# Patient Record
Sex: FEMALE | Race: WHITE | Employment: PART TIME | URBAN - METROPOLITAN AREA
[De-identification: names, ages, dates, MRNs, and addresses within clinical notes are randomized per-mention and may not be internally consistent; named-entity substitution may affect disease eponyms.]

---

## 2017-01-27 ENCOUNTER — TELEPHONE (OUTPATIENT)
Dept: OBGYN CLINIC | Facility: CLINIC | Age: 42
End: 2017-01-27

## 2017-01-27 DIAGNOSIS — Z80.3 FHX: BREAST CANCER: ICD-10-CM

## 2017-01-27 DIAGNOSIS — Z12.31 ENCOUNTER FOR SCREENING MAMMOGRAM FOR HIGH-RISK PATIENT: Primary | ICD-10-CM

## 2017-01-27 NOTE — TELEPHONE ENCOUNTER
Will place order. Please make copy and send to pt. Please remind pt that she is due for annual exam in several months.

## 2017-01-27 NOTE — TELEPHONE ENCOUNTER
PT requesting a mammogram order   Dr. Miguel Cerda ordered her a mammogram order but she needs a different type of mammogram

## 2017-01-27 NOTE — TELEPHONE ENCOUNTER
Last mammogram done 10/19/2015; normal results. Pt has 3D mammograms done due to family history. Routed to Dr. Crawford Daughters. Please advise if OK to order 3D Mammogram for patient to be done at [address] [place] [address] for annual exam in March.

## 2017-02-16 ENCOUNTER — TELEPHONE (OUTPATIENT)
Dept: OBGYN CLINIC | Facility: CLINIC | Age: 42
End: 2017-02-16

## 2017-02-16 ENCOUNTER — HOSPITAL (OUTPATIENT)
Dept: OTHER | Age: 42
End: 2017-02-16
Attending: OBSTETRICS & GYNECOLOGY

## 2017-04-01 ENCOUNTER — OFFICE VISIT (OUTPATIENT)
Dept: OBGYN CLINIC | Facility: CLINIC | Age: 42
End: 2017-04-01

## 2017-04-01 VITALS
WEIGHT: 138 LBS | HEIGHT: 62 IN | HEART RATE: 120 BPM | DIASTOLIC BLOOD PRESSURE: 68 MMHG | BODY MASS INDEX: 25.4 KG/M2 | SYSTOLIC BLOOD PRESSURE: 114 MMHG

## 2017-04-01 DIAGNOSIS — Z97.5 IUD (INTRAUTERINE DEVICE) IN PLACE: ICD-10-CM

## 2017-04-01 DIAGNOSIS — R10.2 PELVIC PAIN IN FEMALE: Primary | ICD-10-CM

## 2017-04-01 DIAGNOSIS — R35.0 URINARY FREQUENCY: ICD-10-CM

## 2017-04-01 DIAGNOSIS — R82.90 ABNORMAL URINE ODOR: ICD-10-CM

## 2017-04-01 PROCEDURE — 99213 OFFICE O/P EST LOW 20 MIN: CPT | Performed by: OBSTETRICS & GYNECOLOGY

## 2017-04-01 PROCEDURE — 87086 URINE CULTURE/COLONY COUNT: CPT | Performed by: OBSTETRICS & GYNECOLOGY

## 2017-04-01 PROCEDURE — 81002 URINALYSIS NONAUTO W/O SCOPE: CPT | Performed by: OBSTETRICS & GYNECOLOGY

## 2017-04-01 RX ORDER — GARLIC EXTRACT 500 MG
1 CAPSULE ORAL DAILY
COMMUNITY
End: 2021-01-19

## 2017-04-01 NOTE — PROGRESS NOTES
Patient is a 36year old here for evaluation of many month hx of LLQ and leg pain and recent onset of suprapubic pressure. Suffered L groin strain and L calf strain several months ago. Difficulty with ambulation and then started having LLQ  Discomfort.  Saw position and LLQ discomfort. Ibuprofen 600 mg BID.

## 2017-04-13 ENCOUNTER — TELEPHONE (OUTPATIENT)
Dept: OBGYN CLINIC | Facility: CLINIC | Age: 42
End: 2017-04-13

## 2017-04-13 ENCOUNTER — HOSPITAL ENCOUNTER (OUTPATIENT)
Dept: ULTRASOUND IMAGING | Facility: HOSPITAL | Age: 42
Discharge: HOME | End: 2017-04-13
Attending: OBSTETRICS & GYNECOLOGY
Payer: COMMERCIAL

## 2017-04-13 DIAGNOSIS — R10.2 PELVIC PAIN IN FEMALE: ICD-10-CM

## 2017-04-13 DIAGNOSIS — Z97.5 IUD (INTRAUTERINE DEVICE) IN PLACE: ICD-10-CM

## 2017-04-13 PROCEDURE — 76856 US EXAM PELVIC COMPLETE: CPT

## 2017-04-13 PROCEDURE — 76830 TRANSVAGINAL US NON-OB: CPT

## 2017-04-13 NOTE — TELEPHONE ENCOUNTER
Patient requesting ultrasound results. Results given to patient. Patient verbalized understanding. No further questions or concerns.

## 2017-04-19 NOTE — PROGRESS NOTES
Quick Note:    Normal findings on pelvic ultrasound. Voice mail to pt to call for questions/next step in evaluation.  Maybe PT.  ______

## 2017-06-16 ENCOUNTER — OFFICE VISIT (OUTPATIENT)
Dept: OBGYN CLINIC | Facility: CLINIC | Age: 42
End: 2017-06-16

## 2017-06-16 VITALS
SYSTOLIC BLOOD PRESSURE: 114 MMHG | WEIGHT: 135 LBS | HEART RATE: 94 BPM | DIASTOLIC BLOOD PRESSURE: 70 MMHG | HEIGHT: 61.75 IN | BODY MASS INDEX: 24.84 KG/M2

## 2017-06-16 DIAGNOSIS — Z12.4 ROUTINE PAPANICOLAOU SMEAR: ICD-10-CM

## 2017-06-16 DIAGNOSIS — Z00.00 LABORATORY EXAM ORDERED AS PART OF ROUTINE GENERAL MEDICAL EXAMINATION: ICD-10-CM

## 2017-06-16 DIAGNOSIS — Z01.419 WELL WOMAN EXAM WITH ROUTINE GYNECOLOGICAL EXAM: Primary | ICD-10-CM

## 2017-06-16 PROCEDURE — 88175 CYTOPATH C/V AUTO FLUID REDO: CPT | Performed by: OBSTETRICS & GYNECOLOGY

## 2017-06-16 PROCEDURE — 87624 HPV HI-RISK TYP POOLED RSLT: CPT | Performed by: OBSTETRICS & GYNECOLOGY

## 2017-06-16 PROCEDURE — 99396 PREV VISIT EST AGE 40-64: CPT | Performed by: OBSTETRICS & GYNECOLOGY

## 2017-06-16 NOTE — PROGRESS NOTES
Patient ID:   Tanner Bhatt  is a 36year old female         HPI:     Here for general gyn exam. Last seen 2017 for pelvic pain. Evaluation without diagnosis, including urine culture and ultrasound. New medical/surgical hx:  None. facility-administered medications on file prior to visit. PHYSICAL EXAM:    Physical Exam   /70 mmHg  Pulse 94  Ht 61.75\"  Wt 135 lb  BMI 24.91 kg/m2  LMP 05/25/2017 (Approximate)  Neck:  Supple. Thyroid:  Normal.  No cervical adenopathy.   Debby Area

## 2018-01-30 ENCOUNTER — PATIENT OUTREACH (OUTPATIENT)
Dept: FAMILY MEDICINE CLINIC | Facility: CLINIC | Age: 43
End: 2018-01-30

## 2018-02-17 ENCOUNTER — TELEPHONE (OUTPATIENT)
Dept: OBGYN CLINIC | Facility: CLINIC | Age: 43
End: 2018-02-17

## 2018-02-17 NOTE — TELEPHONE ENCOUNTER
Pt of Dr Tresa Ferreira  Pt states Dr Tresa Ferreira has given her antibiotics in the past for a sinus infection  Pt would like to get a prescription from Dr Tresa Ferreira for her current sinus infection   Pt states  does this for her all the time

## 2018-02-19 RX ORDER — AZITHROMYCIN 250 MG/1
TABLET, FILM COATED ORAL
Qty: 6 TABLET | Refills: 0 | Status: SHIPPED | OUTPATIENT
Start: 2018-02-19 | End: 2018-02-23

## 2018-02-19 NOTE — TELEPHONE ENCOUNTER
Left detailed message on patient's voicemail informing patient that medication was sent to pharmacy. Patient has FYI.

## 2018-02-22 ENCOUNTER — HOSPITAL (OUTPATIENT)
Dept: OTHER | Age: 43
End: 2018-02-22
Attending: OBSTETRICS & GYNECOLOGY

## 2018-02-23 ENCOUNTER — TELEPHONE (OUTPATIENT)
Dept: OBGYN CLINIC | Facility: CLINIC | Age: 43
End: 2018-02-23

## 2018-02-23 NOTE — TELEPHONE ENCOUNTER
[facility] Mammogram records 2/22/18    Medical Records in Dr. Bella Serrano office in [facility]

## 2018-02-28 ENCOUNTER — MED REC SCAN ONLY (OUTPATIENT)
Dept: OBGYN CLINIC | Facility: CLINIC | Age: 43
End: 2018-02-28

## 2018-03-14 ENCOUNTER — TELEPHONE (OUTPATIENT)
Dept: FAMILY MEDICINE CLINIC | Facility: CLINIC | Age: 43
End: 2018-03-14

## 2018-10-05 ENCOUNTER — OFFICE VISIT (OUTPATIENT)
Dept: OBGYN CLINIC | Facility: CLINIC | Age: 43
End: 2018-10-05
Payer: COMMERCIAL

## 2018-10-05 VITALS
SYSTOLIC BLOOD PRESSURE: 118 MMHG | HEIGHT: 62 IN | HEART RATE: 76 BPM | DIASTOLIC BLOOD PRESSURE: 70 MMHG | WEIGHT: 137 LBS | BODY MASS INDEX: 25.21 KG/M2

## 2018-10-05 DIAGNOSIS — Z80.3 FAMILY HISTORY OF BREAST CANCER IN MOTHER: ICD-10-CM

## 2018-10-05 DIAGNOSIS — Z01.419 WELL WOMAN EXAM WITH ROUTINE GYNECOLOGICAL EXAM: Primary | ICD-10-CM

## 2018-10-05 DIAGNOSIS — Z23 NEED FOR VACCINATION: ICD-10-CM

## 2018-10-05 DIAGNOSIS — Z12.4 CERVICAL CANCER SCREENING: ICD-10-CM

## 2018-10-05 DIAGNOSIS — Z12.39 BREAST CANCER SCREENING: ICD-10-CM

## 2018-10-05 PROCEDURE — 88175 CYTOPATH C/V AUTO FLUID REDO: CPT | Performed by: NURSE PRACTITIONER

## 2018-10-05 PROCEDURE — 90471 IMMUNIZATION ADMIN: CPT | Performed by: NURSE PRACTITIONER

## 2018-10-05 PROCEDURE — 99396 PREV VISIT EST AGE 40-64: CPT | Performed by: NURSE PRACTITIONER

## 2018-10-05 PROCEDURE — 90686 IIV4 VACC NO PRSV 0.5 ML IM: CPT | Performed by: NURSE PRACTITIONER

## 2018-10-05 NOTE — PROGRESS NOTES
Here for Routine Annual Exam  No concerns or questions. Menses are regular, cramps but manageable. Contraception- Paragard IUD. No C/O, does note increased pain in C/S since surgery. It feels deeper with menses.      ROS: No Cardiac, Respiratory, GI,

## 2018-11-02 ENCOUNTER — TELEPHONE (OUTPATIENT)
Dept: OBGYN CLINIC | Facility: CLINIC | Age: 43
End: 2018-11-02

## 2018-11-02 DIAGNOSIS — L76.82 INCISIONAL PAIN: Primary | ICD-10-CM

## 2018-11-02 NOTE — TELEPHONE ENCOUNTER
Please call patient and provide the number for [facility], pelvic floor therapy. (Routing comment)       Patient informed of [facility] number. [phone]. She verbalized understanding. No further questions or concerns.

## 2019-02-05 ENCOUNTER — TELEPHONE (OUTPATIENT)
Dept: OBGYN CLINIC | Facility: CLINIC | Age: 44
End: 2019-02-05

## 2019-02-05 DIAGNOSIS — Z80.3 FAMILY HISTORY OF BREAST CANCER: ICD-10-CM

## 2019-02-05 DIAGNOSIS — Z12.39 BREAST CANCER SCREENING: Primary | ICD-10-CM

## 2019-02-05 DIAGNOSIS — Z80.3 FAMILY HISTORY OF BREAST CANCER IN MOTHER: ICD-10-CM

## 2019-02-05 NOTE — TELEPHONE ENCOUNTER
Last OV: 10/5/18 with Guicho Lo for annual  Last mammogram: 2/22/18 screening mammogram, benign  Next appt: none scheduled; due 10/2019    screening mammogram ordered per protocol.     Order faxed to [facility] [place] Scheduling Dept via Epic at [phone]

## 2019-03-05 ENCOUNTER — TELEPHONE (OUTPATIENT)
Dept: OBGYN CLINIC | Facility: CLINIC | Age: 44
End: 2019-03-05

## 2019-03-05 ENCOUNTER — HOSPITAL (OUTPATIENT)
Dept: OTHER | Age: 44
End: 2019-03-05
Attending: NURSE PRACTITIONER

## 2019-03-05 NOTE — TELEPHONE ENCOUNTER
Received order request from [facility] for abnormal mammogram.    Signed by Migel Deutsch and faxed back to [address]

## 2019-03-06 ENCOUNTER — HOSPITAL (OUTPATIENT)
Dept: OTHER | Age: 44
End: 2019-03-06
Attending: NURSE PRACTITIONER

## 2019-12-10 NOTE — PROGRESS NOTES
GYN H&P     12/10/2019  1:26 PM    CC: Patient is here for annual exam  HPI: patient is a 38year old  here for her annual gyn exam.   She has no complaints. Menses are regular. Denies any pelvic pain or irregular vaginal discharge.    Contraception: Negative for activity change and appetite change. Gastrointestinal: Negative for abdominal pain, blood in stool and abdominal distention.    Genitourinary: Negative for bladder incontinence, urgency, vaginal bleeding, vaginal discharge, breast mass and br (intrauterine device) in place. 9/2010. Paragard     FHx: breast cancer       MMG due in march  Order placed. Will call if needs faxed elsewhere  Paragard due to be replaced in September. She is trying to get her  to have a vasectomy.  I recommend c

## 2019-12-11 ENCOUNTER — OFFICE VISIT (OUTPATIENT)
Dept: OBGYN CLINIC | Facility: CLINIC | Age: 44
End: 2019-12-11
Payer: COMMERCIAL

## 2019-12-11 VITALS
HEIGHT: 62 IN | WEIGHT: 141 LBS | SYSTOLIC BLOOD PRESSURE: 140 MMHG | DIASTOLIC BLOOD PRESSURE: 80 MMHG | BODY MASS INDEX: 25.95 KG/M2

## 2019-12-11 DIAGNOSIS — Z01.419 WELL WOMAN EXAM WITH ROUTINE GYNECOLOGICAL EXAM: Primary | ICD-10-CM

## 2019-12-11 DIAGNOSIS — Z80.3 FAMILY HISTORY OF BREAST CANCER: ICD-10-CM

## 2019-12-11 PROCEDURE — 88175 CYTOPATH C/V AUTO FLUID REDO: CPT | Performed by: OBSTETRICS & GYNECOLOGY

## 2019-12-11 PROCEDURE — 87624 HPV HI-RISK TYP POOLED RSLT: CPT | Performed by: OBSTETRICS & GYNECOLOGY

## 2019-12-11 PROCEDURE — 99396 PREV VISIT EST AGE 40-64: CPT | Performed by: OBSTETRICS & GYNECOLOGY

## 2019-12-16 NOTE — PROGRESS NOTES
Results reviewed with patient. Patient verbalized understanding. Pt states she spotted on 11/24, but bleeding started 11/27. Menses is regular q 28 days. Routed to Dr. Jan Cushing. Please advise.

## 2019-12-16 NOTE — PROGRESS NOTES
Results reviewed. Please inform patient pap is normal and HPV is negative, however, they saw endometrial cells on her pap. I suspect this may be secondary to her having the IUD and the strings perhaps holding onto the endometrial cells.  However, because of

## 2019-12-17 NOTE — PROGRESS NOTES
Patient informed of Dr. Marco Andrew recommendations. Verbalized understanding. No further questions or concerns. Call transferred to [facility] to schedule.

## 2020-01-15 ENCOUNTER — ULTRASOUND ENCOUNTER (OUTPATIENT)
Dept: OBGYN CLINIC | Facility: CLINIC | Age: 45
End: 2020-01-15
Payer: COMMERCIAL

## 2020-01-15 DIAGNOSIS — R87.618 BENIGN-APPEARING ENDOMETRIAL CELLS ON CERVICAL CYTOLOGY: Primary | ICD-10-CM

## 2020-01-15 PROCEDURE — 76830 TRANSVAGINAL US NON-OB: CPT | Performed by: OBSTETRICS & GYNECOLOGY

## 2020-01-15 PROCEDURE — 76856 US EXAM PELVIC COMPLETE: CPT | Performed by: OBSTETRICS & GYNECOLOGY

## 2020-03-07 DIAGNOSIS — Z12.39 SCREENING BREAST EXAMINATION: Primary | ICD-10-CM

## 2020-03-17 ENCOUNTER — APPOINTMENT (OUTPATIENT)
Dept: MAMMOGRAPHY | Age: 45
End: 2020-03-17

## 2020-03-27 ENCOUNTER — TELEPHONE (OUTPATIENT)
Dept: OBGYN CLINIC | Facility: CLINIC | Age: 45
End: 2020-03-27

## 2020-03-27 DIAGNOSIS — N63.10 LUMP OF RIGHT BREAST: Primary | ICD-10-CM

## 2020-03-27 NOTE — TELEPHONE ENCOUNTER
55 y/o called regarding lump under right breast. She states it is very small, similar to a pimple. She states she has lost 5-6 lbs and was unsure if it has been there or if it is new.  States she does have dense breasts and a lot of lumps but this one is ne

## 2020-03-27 NOTE — TELEPHONE ENCOUNTER
Per Dr. Pradeep Johns, ok to change to diagnostic mammogram with US if needed. Patient notified. Patient verbalized understanding. Order placed.

## 2020-03-27 NOTE — TELEPHONE ENCOUNTER
Pt calling and she postponed her Mammogram due to Virus    Pt now feels a lump under her right breast    Pt gets Mammo's done at [facility]    Please call

## 2020-04-07 NOTE — TELEPHONE ENCOUNTER
Patient wanted the order faxed to [facility]. This was faxed to [phone].

## 2020-04-13 NOTE — TELEPHONE ENCOUNTER
Re-Faxed to [facility] with Nurse's signature emergency care. For example, call if:  · You passed out (lost consciousness). Call your doctor now or seek immediate medical care if:  · You have symptoms of dehydration, such as:  ¨ Dry eyes and a dry mouth. ¨ Passing only a little dark urine. ¨ Feeling thirstier than usual.  · You have new or worsening belly pain. · You have a new or higher fever. · You vomit blood or what looks like coffee grounds. Watch closely for changes in your health, and be sure to contact your doctor if:  · You have ongoing nausea and vomiting. · Your vomiting is getting worse. · Your vomiting lasts longer than 2 days. · You are not getting better as expected. Where can you learn more? Go to https://HeatSync.Blitsy. org and sign in to your [employer] account. Enter 81 [id number] in the Rouxbe box to learn more about \"Nausea and Vomiting: Care Instructions. \"     If you do not have an account, please click on the \"Sign Up Now\" link. Current as of: March 20, 2017  Content Version: 11.3  © 7217-3813 [employer]. Care instructions adapted under license by [facility] ([facility]). If you have questions about a medical condition or this instruction, always ask your healthcare professional. [place] [address] any warranty or liability for your use of this information.

## 2020-04-21 ENCOUNTER — HOSPITAL ENCOUNTER (OUTPATIENT)
Dept: MAMMOGRAPHY | Age: 45
Discharge: HOME | End: 2020-04-21

## 2020-04-21 ENCOUNTER — HOSPITAL ENCOUNTER (OUTPATIENT)
Dept: ULTRASOUND IMAGING | Age: 45
Discharge: HOME | End: 2020-04-21
Attending: OBSTETRICS & GYNECOLOGY

## 2020-04-21 DIAGNOSIS — N63.0 LUMP IN FEMALE BREAST: ICD-10-CM

## 2020-04-21 DIAGNOSIS — N63.10 LUMP OF RIGHT BREAST: ICD-10-CM

## 2020-04-21 PROCEDURE — G0279 TOMOSYNTHESIS, MAMMO: HCPCS

## 2020-04-21 PROCEDURE — 76642 ULTRASOUND BREAST LIMITED: CPT

## 2020-10-01 ENCOUNTER — TELEPHONE (OUTPATIENT)
Dept: OBGYN CLINIC | Facility: CLINIC | Age: 45
End: 2020-10-01

## 2020-10-01 NOTE — TELEPHONE ENCOUNTER
Pt states that doctor was to call in a medication to help soften cervix. Please call pt when sent.     appt scheduled  Future Appointments   Date Time Provider Concepción Olsen   11/19/2020  3:30 PM Tracee Valverde MD [facility]   1/19/2021

## 2020-10-01 NOTE — TELEPHONE ENCOUNTER
54year old patient calling with questions about IUD. She currently has a paragard in place and it is due to come out. Wants to know if she should get another paragard or if there is one that can be used for a shorter amount of time.  She states that she d

## 2020-10-05 RX ORDER — MISOPROSTOL 200 UG/1
TABLET ORAL
Qty: 2 TABLET | Refills: 0 | Status: SHIPPED | OUTPATIENT
Start: 2020-10-05 | End: 2021-01-19

## 2020-10-05 NOTE — TELEPHONE ENCOUNTER
Please send cytotec 400 mcg per vagina 4 hours prior to procedure. Advise if she can take ibuprofen to take 600 mg ibuprofen at the same time. Please advise her may cause cramping, bleeding, N/V/diarrhea.  Thank you     Patient informed of recommendatio

## 2020-10-06 ENCOUNTER — TELEPHONE (OUTPATIENT)
Dept: OBGYN CLINIC | Facility: CLINIC | Age: 45
End: 2020-10-06

## 2020-10-06 NOTE — TELEPHONE ENCOUNTER
Patient is to have a 6-month follow-mammogram.  Please see if that is in the system. If not, please put the order in and then call patient.

## 2020-10-26 ENCOUNTER — TELEPHONE (OUTPATIENT)
Dept: OBGYN CLINIC | Facility: CLINIC | Age: 45
End: 2020-10-26

## 2020-10-26 DIAGNOSIS — N63.0 BREAST NODULE: Primary | ICD-10-CM

## 2020-10-26 NOTE — TELEPHONE ENCOUNTER
Last OV: 12/11/19 with Dr. An Gray for annual    Last mammogram: 4/21/20 bilateral diagnostic with u/s for c/o lump in right breast, birads 3- probable benign- recommendation for f/u mammo for left breast in 6 mos due to nodule found on imaging.  No f/u sammie

## 2020-11-09 ENCOUNTER — APPOINTMENT (OUTPATIENT)
Dept: MAMMOGRAPHY | Age: 45
End: 2020-11-09
Attending: OBSTETRICS & GYNECOLOGY

## 2020-11-18 ENCOUNTER — APPOINTMENT (OUTPATIENT)
Dept: MAMMOGRAPHY | Age: 45
End: 2020-11-18
Attending: OBSTETRICS & GYNECOLOGY

## 2020-11-30 ENCOUNTER — APPOINTMENT (OUTPATIENT)
Dept: MAMMOGRAPHY | Age: 45
End: 2020-11-30
Attending: OBSTETRICS & GYNECOLOGY

## 2020-12-08 ENCOUNTER — APPOINTMENT (OUTPATIENT)
Dept: MAMMOGRAPHY | Age: 45
End: 2020-12-08
Attending: OBSTETRICS & GYNECOLOGY

## 2020-12-17 ENCOUNTER — TELEPHONE (OUTPATIENT)
Dept: OBGYN CLINIC | Facility: CLINIC | Age: 45
End: 2020-12-17

## 2020-12-17 NOTE — TELEPHONE ENCOUNTER
Pt advised we do not have her blood type in Epic. Only checked during pregnancy and records are in storage. Pt would like her entire medical records. Routed to [facility] staff. Please call patient to instruct her how to obtain all her medical records.

## 2020-12-23 NOTE — TELEPHONE ENCOUNTER
Spoke with PT  Advised her Blood type is O+    Holding chart in [place] to send for Medical Records release in January

## 2021-01-13 ENCOUNTER — APPOINTMENT (OUTPATIENT)
Dept: MAMMOGRAPHY | Age: 46
End: 2021-01-13
Attending: OBSTETRICS & GYNECOLOGY

## 2021-01-18 PROBLEM — M54.16 LEFT LUMBAR RADICULITIS: Status: ACTIVE | Noted: 2019-10-29

## 2021-01-18 PROBLEM — Z30.433 ENCOUNTER FOR REMOVAL AND REINSERTION OF INTRAUTERINE CONTRACEPTIVE DEVICE (IUD): Status: ACTIVE | Noted: 2021-01-18

## 2021-01-18 PROBLEM — R94.31 QT PROLONGATION: Status: ACTIVE | Noted: 2019-09-11

## 2021-01-19 ENCOUNTER — OFFICE VISIT (OUTPATIENT)
Dept: OBGYN CLINIC | Facility: CLINIC | Age: 46
End: 2021-01-19
Payer: COMMERCIAL

## 2021-01-19 VITALS
HEIGHT: 62 IN | SYSTOLIC BLOOD PRESSURE: 120 MMHG | DIASTOLIC BLOOD PRESSURE: 78 MMHG | BODY MASS INDEX: 26.13 KG/M2 | WEIGHT: 142 LBS

## 2021-01-19 DIAGNOSIS — Z01.419 WELL WOMAN EXAM WITH ROUTINE GYNECOLOGICAL EXAM: Primary | ICD-10-CM

## 2021-01-19 DIAGNOSIS — Z80.3 FHX: BREAST CANCER: ICD-10-CM

## 2021-01-19 DIAGNOSIS — Z12.4 SCREENING FOR MALIGNANT NEOPLASM OF CERVIX: ICD-10-CM

## 2021-01-19 DIAGNOSIS — Z97.5 IUD (INTRAUTERINE DEVICE) IN PLACE: ICD-10-CM

## 2021-01-19 PROCEDURE — 87624 HPV HI-RISK TYP POOLED RSLT: CPT | Performed by: OBSTETRICS & GYNECOLOGY

## 2021-01-19 PROCEDURE — 88175 CYTOPATH C/V AUTO FLUID REDO: CPT | Performed by: OBSTETRICS & GYNECOLOGY

## 2021-01-19 PROCEDURE — 3074F SYST BP LT 130 MM HG: CPT | Performed by: OBSTETRICS & GYNECOLOGY

## 2021-01-19 PROCEDURE — 99396 PREV VISIT EST AGE 40-64: CPT | Performed by: OBSTETRICS & GYNECOLOGY

## 2021-01-19 PROCEDURE — 3008F BODY MASS INDEX DOCD: CPT | Performed by: OBSTETRICS & GYNECOLOGY

## 2021-01-19 PROCEDURE — 3078F DIAST BP <80 MM HG: CPT | Performed by: OBSTETRICS & GYNECOLOGY

## 2021-01-19 RX ORDER — MISOPROSTOL 200 UG/1
TABLET ORAL
Qty: 2 TABLET | Refills: 0 | Status: SHIPPED | OUTPATIENT
Start: 2021-01-19 | End: 2021-11-03

## 2021-01-19 NOTE — PROGRESS NOTES
GYN H&P     2021  11:11 AM    CC: Patient is here for annual exam    HPI: patient is a 54year old  here for her annual exam  She has paragard IUD in place. Due to be replaced.    Pap last year normal but with endometrial cells (but was near men Problem Relation Age of Onset   • Heart Disorder Father    • Lipids Father    • Hypertension Mother    • Cancer Mother    • Hypertension Maternal Grandmother    • Cancer Maternal Grandfather    • Heart Disorder Paternal Grandmother    • Heart Disorder [place] No erythema, tenderness or bleeding in the vagina. No foreign body in the vagina. No signs of injury in the vagina.       Genitourinary Comments: IUD strings very short but appear to be visible in the cervical canal  No menstrual blood noted, cervix

## 2021-01-19 NOTE — PATIENT INSTRUCTIONS
Cytotec 2 pills per vagina 4 hours prior. Place as far in as possible  Try to lay down after for about 30 minutes  Take 600 mg of ibuprofen at the same time. (3 OTC)  Eat something.     If breast tenderness continues, try Evening Primrose Oil (once daily) o

## 2021-01-20 ENCOUNTER — HOSPITAL ENCOUNTER (OUTPATIENT)
Dept: ULTRASOUND IMAGING | Age: 46
Discharge: HOME | End: 2021-01-20
Attending: OBSTETRICS & GYNECOLOGY

## 2021-01-20 ENCOUNTER — HOSPITAL ENCOUNTER (OUTPATIENT)
Dept: MAMMOGRAPHY | Age: 46
Discharge: HOME | End: 2021-01-20
Attending: OBSTETRICS & GYNECOLOGY

## 2021-01-20 DIAGNOSIS — R92.8 ABNORMAL MAMMOGRAM: ICD-10-CM

## 2021-01-20 DIAGNOSIS — N63.0 BREAST NODULE: ICD-10-CM

## 2021-01-20 LAB — HPV I/H RISK 1 DNA SPEC QL NAA+PROBE: NEGATIVE

## 2021-01-20 PROCEDURE — 76642 ULTRASOUND BREAST LIMITED: CPT

## 2021-01-20 PROCEDURE — G0279 TOMOSYNTHESIS, MAMMO: HCPCS

## 2021-01-21 ENCOUNTER — TELEPHONE (OUTPATIENT)
Dept: OBGYN CLINIC | Facility: CLINIC | Age: 46
End: 2021-01-21

## 2021-01-21 NOTE — TELEPHONE ENCOUNTER
Results of mammogram and orders for breast biopsy. This was put in Dr. Valentino Mutton mail box in [place].

## 2021-01-22 ENCOUNTER — MED REC SCAN ONLY (OUTPATIENT)
Dept: OBGYN CLINIC | Facility: CLINIC | Age: 46
End: 2021-01-22

## 2021-01-26 ENCOUNTER — HOSPITAL ENCOUNTER (OUTPATIENT)
Dept: ULTRASOUND IMAGING | Age: 46
Discharge: HOME | End: 2021-01-26
Attending: OBSTETRICS & GYNECOLOGY

## 2021-01-26 ENCOUNTER — HOSPITAL ENCOUNTER (OUTPATIENT)
Dept: MAMMOGRAPHY | Age: 46
Discharge: HOME | End: 2021-01-26
Attending: OBSTETRICS & GYNECOLOGY

## 2021-01-26 DIAGNOSIS — N63.20 LEFT BREAST MASS: ICD-10-CM

## 2021-01-26 DIAGNOSIS — N63.20 BREAST MASS, LEFT: ICD-10-CM

## 2021-01-26 PROCEDURE — 10006023 HB SUPPLY 272

## 2021-01-26 PROCEDURE — 77065 DX MAMMO INCL CAD UNI: CPT

## 2021-01-26 PROCEDURE — 88360 TUMOR IMMUNOHISTOCHEM/MANUAL: CPT | Performed by: OBSTETRICS & GYNECOLOGY

## 2021-01-26 PROCEDURE — 19083 BX BREAST 1ST LESION US IMAG: CPT

## 2021-01-26 PROCEDURE — 10006027 HB SUPPLY 278

## 2021-01-26 PROCEDURE — A4648 IMPLANTABLE TISSUE MARKER: HCPCS

## 2021-01-26 PROCEDURE — 88377 M/PHMTRC ALYS ISHQUANT/SEMIQ: CPT | Performed by: OBSTETRICS & GYNECOLOGY

## 2021-01-26 RX ORDER — LIDOCAINE HYDROCHLORIDE 20 MG/ML
4 INJECTION, SOLUTION INFILTRATION; PERINEURAL ONCE
Status: DISCONTINUED | OUTPATIENT
Start: 2021-01-26 | End: 2021-01-28 | Stop reason: HOSPADM

## 2021-01-28 LAB
ASR DISCLAIMER: NORMAL
CASE RPRT: NORMAL
CLINICAL INFO: NORMAL
PATH REPORT ADDENDUM.SYNOPTIC DOC: NORMAL
PATH REPORT.FINAL DX SPEC: NORMAL
PATH REPORT.GROSS SPEC: NORMAL

## 2021-01-29 ENCOUNTER — TELEPHONE (OUTPATIENT)
Dept: MAMMOGRAPHY | Age: 46
End: 2021-01-29

## 2021-01-29 ENCOUNTER — TELEPHONE (OUTPATIENT)
Dept: OBGYN CLINIC | Facility: CLINIC | Age: 46
End: 2021-01-29

## 2021-01-29 ENCOUNTER — CANCER NAVIGATOR (OUTPATIENT)
Dept: ONCOLOGY | Age: 46
End: 2021-01-29

## 2021-01-29 NOTE — TELEPHONE ENCOUNTER
Received Final Report of Mammo and US  From [address] [place] [address] in Dr. Topher hartmann in [place]

## 2021-01-29 NOTE — TELEPHONE ENCOUNTER
Received breast biopsy results from [address] [place] [address].    These results were scanned into the \"media\" tab for review. Bx results invasive ductal carcinoma, thus far prelim results ER/NC negative but HER-2neu pending.     I reviewed with

## 2021-02-01 ENCOUNTER — TELEPHONE (OUTPATIENT)
Dept: OBGYN CLINIC | Facility: CLINIC | Age: 46
End: 2021-02-01

## 2021-02-01 LAB
CASE OR PARAFFIN BLOCK NUMBER: NORMAL
CELLS COUNTED: NORMAL
COLD ISCHEMIA AND FIXATION TIMES: NORMAL
GENE ANALYSIS NARR RPT DOC: NORMAL
INTERPRETATIVE RANGES: NORMAL
LAB TEST METHOD: NORMAL
SERVICE CMNT-IMP: NORMAL
SPECIMEN LABELED AS: NORMAL

## 2021-02-01 NOTE — TELEPHONE ENCOUNTER
Patient cancelled  appt for her IUD removal and insert for 2/5. She would like to be rescheduled with in the next two weeks. Where should I put her in [place]? The 15th acute spot?  Please [place]

## 2021-02-02 NOTE — TELEPHONE ENCOUNTER
Hi Dr Robin Speaker  Patient can not go to [facility]. She lives up [facility]. Can I use an on call day? 2/10 acute 12:00 or another on call day with an opening? 2/25?

## 2021-02-03 ENCOUNTER — TELEPHONE (OUTPATIENT)
Dept: OBGYN CLINIC | Facility: CLINIC | Age: 46
End: 2021-02-03

## 2021-02-03 NOTE — TELEPHONE ENCOUNTER
Patient has Paragard IUD that is due for removal.    She was recently diagnosed with cancer so she is having challenges with scheduling all the appointments related to her cancer. She has canceled her IUD removal and insertion multiple times due to this.

## 2021-09-13 ENCOUNTER — TELEPHONE (OUTPATIENT)
Dept: OBGYN CLINIC | Facility: CLINIC | Age: 46
End: 2021-09-13

## 2021-09-13 NOTE — TELEPHONE ENCOUNTER
Last OV: 1/19/21 with Dr. Roxi Interiano for annual. Per notes, Paragard IUD in place and due to be removed this year. Returned call to patient. Questions answered regarding IUD.  Advised to schedule appt to have IUD removed and can discuss at appt if replacemen

## 2021-11-03 ENCOUNTER — OFFICE VISIT (OUTPATIENT)
Dept: OBGYN CLINIC | Facility: CLINIC | Age: 46
End: 2021-11-03
Payer: COMMERCIAL

## 2021-11-03 VITALS
HEIGHT: 62 IN | SYSTOLIC BLOOD PRESSURE: 120 MMHG | BODY MASS INDEX: 26.68 KG/M2 | WEIGHT: 145 LBS | DIASTOLIC BLOOD PRESSURE: 78 MMHG

## 2021-11-03 DIAGNOSIS — Z97.5 IUD (INTRAUTERINE DEVICE) IN PLACE: Primary | ICD-10-CM

## 2021-11-03 PROCEDURE — 58301 REMOVE INTRAUTERINE DEVICE: CPT | Performed by: OBSTETRICS & GYNECOLOGY

## 2021-11-03 PROCEDURE — 3074F SYST BP LT 130 MM HG: CPT | Performed by: OBSTETRICS & GYNECOLOGY

## 2021-11-03 PROCEDURE — 3078F DIAST BP <80 MM HG: CPT | Performed by: OBSTETRICS & GYNECOLOGY

## 2021-11-03 PROCEDURE — 3008F BODY MASS INDEX DOCD: CPT | Performed by: OBSTETRICS & GYNECOLOGY

## 2021-11-03 NOTE — PROGRESS NOTES
Procedure Note: IUD removal     Preoperative Diagnosis:  IUD in place     Postoperative Diagnosis:  S/p IUD removal     Indications:  51year old y/o  female with paragard IUD in place x 10 years.  She was dx with triple negative breast cancer last y
[phone]

## 2021-11-05 ENCOUNTER — MED REC SCAN ONLY (OUTPATIENT)
Dept: OBGYN CLINIC | Facility: CLINIC | Age: 46
End: 2021-11-05

## 2022-01-10 PROCEDURE — 87624 HPV HI-RISK TYP POOLED RSLT: CPT | Performed by: OBSTETRICS & GYNECOLOGY

## 2022-01-11 ENCOUNTER — OFFICE VISIT (OUTPATIENT)
Dept: OBGYN CLINIC | Facility: CLINIC | Age: 47
End: 2022-01-11
Payer: COMMERCIAL

## 2022-01-11 VITALS
DIASTOLIC BLOOD PRESSURE: 86 MMHG | BODY MASS INDEX: 27.27 KG/M2 | WEIGHT: 148.19 LBS | HEIGHT: 62 IN | SYSTOLIC BLOOD PRESSURE: 122 MMHG | HEART RATE: 90 BPM

## 2022-01-11 DIAGNOSIS — C50.919 MALIGNANT NEOPLASM OF BREAST IN FEMALE, ESTROGEN RECEPTOR NEGATIVE, UNSPECIFIED LATERALITY, UNSPECIFIED SITE OF BREAST (HCC): ICD-10-CM

## 2022-01-11 DIAGNOSIS — Z97.5 IUD (INTRAUTERINE DEVICE) IN PLACE: ICD-10-CM

## 2022-01-11 DIAGNOSIS — Z12.4 SCREENING FOR MALIGNANT NEOPLASM OF CERVIX: ICD-10-CM

## 2022-01-11 DIAGNOSIS — Z30.40 ENCOUNTER FOR SURVEILLANCE OF CONTRACEPTIVES, UNSPECIFIED CONTRACEPTIVE: ICD-10-CM

## 2022-01-11 DIAGNOSIS — Z01.419 WELL WOMAN EXAM WITH ROUTINE GYNECOLOGICAL EXAM: Primary | ICD-10-CM

## 2022-01-11 DIAGNOSIS — Z17.1 MALIGNANT NEOPLASM OF BREAST IN FEMALE, ESTROGEN RECEPTOR NEGATIVE, UNSPECIFIED LATERALITY, UNSPECIFIED SITE OF BREAST (HCC): ICD-10-CM

## 2022-01-11 PROCEDURE — 3074F SYST BP LT 130 MM HG: CPT | Performed by: OBSTETRICS & GYNECOLOGY

## 2022-01-11 PROCEDURE — 3008F BODY MASS INDEX DOCD: CPT | Performed by: OBSTETRICS & GYNECOLOGY

## 2022-01-11 PROCEDURE — 99396 PREV VISIT EST AGE 40-64: CPT | Performed by: OBSTETRICS & GYNECOLOGY

## 2022-01-11 PROCEDURE — 3079F DIAST BP 80-89 MM HG: CPT | Performed by: OBSTETRICS & GYNECOLOGY

## 2022-01-11 PROCEDURE — 88175 CYTOPATH C/V AUTO FLUID REDO: CPT | Performed by: OBSTETRICS & GYNECOLOGY

## 2022-01-11 RX ORDER — MISOPROSTOL 200 UG/1
400 TABLET ORAL ONCE
Qty: 2 TABLET | Refills: 0 | Status: SHIPPED | OUTPATIENT
Start: 2022-01-11 | End: 2022-01-11

## 2022-01-17 PROBLEM — C50.919 MALIGNANT NEOPLASM OF BREAST IN FEMALE, ESTROGEN RECEPTOR NEGATIVE (HCC): Status: ACTIVE | Noted: 2022-01-17

## 2022-01-17 PROBLEM — C50.919 MALIGNANT NEOPLASM OF BREAST IN FEMALE, ESTROGEN RECEPTOR NEGATIVE: Status: ACTIVE | Noted: 2022-01-17

## 2022-01-17 PROBLEM — Z30.433 ENCOUNTER FOR REMOVAL AND REINSERTION OF INTRAUTERINE CONTRACEPTIVE DEVICE (IUD): Status: RESOLVED | Noted: 2021-01-18 | Resolved: 2022-01-17

## 2022-01-17 PROBLEM — Z17.1 MALIGNANT NEOPLASM OF BREAST IN FEMALE, ESTROGEN RECEPTOR NEGATIVE (HCC): Status: ACTIVE | Noted: 2022-01-17

## 2022-01-17 PROBLEM — Z17.1 MALIGNANT NEOPLASM OF BREAST IN FEMALE, ESTROGEN RECEPTOR NEGATIVE: Status: ACTIVE | Noted: 2022-01-17

## 2022-01-18 LAB — HPV I/H RISK 1 DNA SPEC QL NAA+PROBE: NEGATIVE

## 2022-01-18 NOTE — PROGRESS NOTES
GYN H&P     2022  9:04 PM    CC: Patient is here for annual exam    HPI: patient is a 51year old  here for her annual exam    She was last seen 2021 for her paragard IUD removal. Since she has had two normal periods.  She does not want Cancer Mother    • Hypertension Maternal Grandmother    • Cancer Maternal Grandfather    • Heart Disorder Paternal Grandmother    • Heart Disorder Paternal Grandfather        Review of Systems   Constitutional: Negative for activity change and appetite roxanna here for   1. Well woman exam with routine gynecological exam    2. IUD (intrauterine device) in place. 9/2010. Paragard    3. Screening for malignant neoplasm of cervix    4.  Malignant neoplasm of breast in female, estrogen receptor negative, unspecified

## 2022-01-31 ENCOUNTER — TELEPHONE (OUTPATIENT)
Dept: OBGYN CLINIC | Facility: CLINIC | Age: 47
End: 2022-01-31

## 2022-01-31 NOTE — TELEPHONE ENCOUNTER
Pt is supposed to take some medication before her IUD tomorrow    Pt says it was not sent to [facility]

## 2022-01-31 NOTE — TELEPHONE ENCOUNTER
Patient notified that her prescription was sent 01/11/2022 and encouraged to call [facility] to have them fill the medication  Patient verbalizes understanding.

## 2022-02-01 ENCOUNTER — OFFICE VISIT (OUTPATIENT)
Dept: OBGYN CLINIC | Facility: CLINIC | Age: 47
End: 2022-02-01
Payer: COMMERCIAL

## 2022-02-01 VITALS
DIASTOLIC BLOOD PRESSURE: 88 MMHG | HEART RATE: 71 BPM | SYSTOLIC BLOOD PRESSURE: 130 MMHG | WEIGHT: 135 LBS | HEIGHT: 62 IN | BODY MASS INDEX: 24.84 KG/M2

## 2022-02-01 DIAGNOSIS — Z01.812 PRE-PROCEDURAL LABORATORY EXAMINATION: Primary | ICD-10-CM

## 2022-02-01 DIAGNOSIS — Z30.430 ENCOUNTER FOR IUD INSERTION: ICD-10-CM

## 2022-02-01 LAB — CONTROL LINE PRESENT WITH A CLEAR BACKGROUND (YES/NO): YES YES/NO

## 2022-02-01 PROCEDURE — 3008F BODY MASS INDEX DOCD: CPT | Performed by: OBSTETRICS & GYNECOLOGY

## 2022-02-01 PROCEDURE — 3079F DIAST BP 80-89 MM HG: CPT | Performed by: OBSTETRICS & GYNECOLOGY

## 2022-02-01 PROCEDURE — 81025 URINE PREGNANCY TEST: CPT | Performed by: OBSTETRICS & GYNECOLOGY

## 2022-02-01 PROCEDURE — 3075F SYST BP GE 130 - 139MM HG: CPT | Performed by: OBSTETRICS & GYNECOLOGY

## 2022-02-01 PROCEDURE — 58300 INSERT INTRAUTERINE DEVICE: CPT | Performed by: OBSTETRICS & GYNECOLOGY

## 2022-02-01 RX ORDER — COPPER 313.4 MG/1
1 INTRAUTERINE DEVICE INTRAUTERINE ONCE
Status: COMPLETED | OUTPATIENT
Start: 2022-02-01 | End: 2022-02-01

## 2022-02-01 RX ORDER — MISOPROSTOL 200 UG/1
TABLET ORAL
COMMUNITY
Start: 2022-01-31 | End: 2022-03-03

## 2022-02-01 RX ADMIN — COPPER 1 DEVICE: 313.4 INTRAUTERINE DEVICE INTRAUTERINE at 13:07:00

## 2022-02-01 NOTE — PROGRESS NOTES
Procedure:  1. IUD insertion, paragard      Findings: midposition uterus sounded to 8 cm with stenotic internal os. Able to be traversed with sound only    Indications:   Patient is a 51year old  who presents for paragard IUD insertion. She had used cytotec pre-procedure due to stenotic cervix Risks were discussed, including bleeding, infection, and uterine perforation. A urine HCG was performed and was negative. Consent form signed. Procedure:  Patient was taken to the procedure room. A sterile speculum was placed and the cervix was visualized and cleansed with betadine. A single toothed tenaculum was placed. The uterus was sounded to 8 cm. The paragard IUD was then placed without difficulty. The strings were trimmed to 1.5 cm. The tenaculum was removed. Sites hemostatic with pressure and silver nitrate. The speculum was removed. Postprocedure instructions were reviewed, including pelvic rest. She was advised to return in 4 weeks for an IUD string check.     Elvira Friday, MD, 22, 12:46 PM

## 2022-03-03 ENCOUNTER — OFFICE VISIT (OUTPATIENT)
Dept: OBGYN CLINIC | Facility: CLINIC | Age: 47
End: 2022-03-03
Payer: COMMERCIAL

## 2022-03-03 VITALS
HEIGHT: 62 IN | SYSTOLIC BLOOD PRESSURE: 142 MMHG | BODY MASS INDEX: 27.42 KG/M2 | WEIGHT: 149 LBS | DIASTOLIC BLOOD PRESSURE: 94 MMHG

## 2022-03-03 DIAGNOSIS — Z30.431 IUD CHECK UP: Primary | ICD-10-CM

## 2022-03-03 PROBLEM — Z30.430 ENCOUNTER FOR IUD INSERTION: Status: RESOLVED | Noted: 2022-02-01 | Resolved: 2022-03-03

## 2022-03-03 PROCEDURE — 3080F DIAST BP >= 90 MM HG: CPT | Performed by: OBSTETRICS & GYNECOLOGY

## 2022-03-03 PROCEDURE — 3008F BODY MASS INDEX DOCD: CPT | Performed by: OBSTETRICS & GYNECOLOGY

## 2022-03-03 PROCEDURE — 3077F SYST BP >= 140 MM HG: CPT | Performed by: OBSTETRICS & GYNECOLOGY

## 2022-03-14 ENCOUNTER — TELEPHONE (OUTPATIENT)
Dept: OBGYN CLINIC | Facility: CLINIC | Age: 47
End: 2022-03-14

## 2022-03-14 NOTE — TELEPHONE ENCOUNTER
Patient is bleeding with her IUD. She states that she has not bled like this before. She started bleeding on Thurs and is bleeding heavy. She did not have her period from May until Dec during chemo. She is not sure if that may be the reason, but she feels something is not right.  Please call to advise

## 2022-03-14 NOTE — TELEPHONE ENCOUNTER
49year old patient complaining of heavy bleeding. LMP: 3/7-8. Got progressively heavier. She also reports abdominal discomfort. She is bleeding through clothing. Using super tampons and is soaking through them in an hour at times. Denies soaking today. Last OV date: 2/1/22 with Dr. Luz Vergara for paragard IUD placement  Recent Test/Labs: normal pap on 1/11/22   Recommendations: Patient desires to have IUD removed. Offered appt in office tomorrow. Patient declines. Offered appt on Wednesday. She agrees to this appointment. Scheduled. Advised on ER/bleeding precautions and ibuprofen 600 mg every 6 hours for pain and bleeding management. Patient states understanding.

## 2022-03-15 NOTE — TELEPHONE ENCOUNTER
Patient is calling asking if she needs to take a medication before getting her IUD removed.  Please call to advise

## 2022-03-15 NOTE — TELEPHONE ENCOUNTER
Patient wanted to know if she needed to take Cytotec before having her IUD removed. Patient notified that we look for the IUD strings to remove the IUD. No Cytotec should be needed.

## 2022-03-16 ENCOUNTER — OFFICE VISIT (OUTPATIENT)
Dept: OBGYN CLINIC | Facility: CLINIC | Age: 47
End: 2022-03-16
Payer: COMMERCIAL

## 2022-03-16 VITALS
WEIGHT: 149 LBS | HEART RATE: 95 BPM | BODY MASS INDEX: 27.42 KG/M2 | SYSTOLIC BLOOD PRESSURE: 132 MMHG | DIASTOLIC BLOOD PRESSURE: 80 MMHG | HEIGHT: 62 IN

## 2022-03-16 DIAGNOSIS — Z30.432 ENCOUNTER FOR REMOVAL OF INTRAUTERINE CONTRACEPTIVE DEVICE: Primary | ICD-10-CM

## 2022-03-16 PROCEDURE — 3079F DIAST BP 80-89 MM HG: CPT | Performed by: NURSE PRACTITIONER

## 2022-03-16 PROCEDURE — 3075F SYST BP GE 130 - 139MM HG: CPT | Performed by: NURSE PRACTITIONER

## 2022-03-16 PROCEDURE — 3008F BODY MASS INDEX DOCD: CPT | Performed by: NURSE PRACTITIONER

## 2022-03-16 PROCEDURE — 58301 REMOVE INTRAUTERINE DEVICE: CPT | Performed by: NURSE PRACTITIONER

## 2022-03-16 RX ORDER — FLUTICASONE PROPIONATE 50 MCG
SPRAY, SUSPENSION (ML) NASAL AS DIRECTED
COMMUNITY

## 2022-06-15 ENCOUNTER — TELEPHONE (OUTPATIENT)
Dept: OBGYN CLINIC | Facility: CLINIC | Age: 47
End: 2022-06-15

## 2022-06-16 NOTE — TELEPHONE ENCOUNTER
Returned call to patient. She is scheduled for the breast MRI on day 6 of her cycle and was told by her surgeon that she should schedule it for day 7-12 of her cycle. She wants to know if she should reschedule it. Advised patient that Dr. Raquel Marshall would follow the recommendations of radiology or the surgeon who advised the patient. Patient states she would like Dr. Cindy Orozco opinion to see if it will matter if she has the scan on day 6 of her cycle. She states that the others will give her a blanket statement but Dr. Raquel Marshall will explain it further. Routed to Dr. Raquel Marshall.

## 2022-06-16 NOTE — TELEPHONE ENCOUNTER
Drake Lopez MD  [facility] Nurse  Caller: Unspecified (Yesterday, 11:49 AM)  I feel fine with her getting her breast MRI (personally) at any point of her cycle, as the MRI is supposed to be the gold standard for detection of breast cancer, whether or not she is menstruating. SO: I would just move forward and get it (but agree, since the patient has a history of breast CA I would make sure she queries her breast surgeon and/or oncologist, whoever ordered it)     Contacted patient and advised as noted. She states understanding.

## 2023-03-01 ENCOUNTER — OFFICE VISIT (OUTPATIENT)
Dept: OBGYN CLINIC | Facility: CLINIC | Age: 48
End: 2023-03-01
Payer: COMMERCIAL

## 2023-03-01 VITALS
HEART RATE: 75 BPM | RESPIRATION RATE: 20 BRPM | WEIGHT: 152 LBS | DIASTOLIC BLOOD PRESSURE: 80 MMHG | HEIGHT: 62 IN | BODY MASS INDEX: 27.97 KG/M2 | SYSTOLIC BLOOD PRESSURE: 122 MMHG

## 2023-03-01 DIAGNOSIS — Z85.3 PERSONAL HISTORY OF BREAST CANCER: ICD-10-CM

## 2023-03-01 DIAGNOSIS — Z12.4 CERVICAL CANCER SCREENING: ICD-10-CM

## 2023-03-01 DIAGNOSIS — N93.9 ABNORMAL UTERINE BLEEDING (AUB): ICD-10-CM

## 2023-03-01 DIAGNOSIS — Z01.419 WELL WOMAN EXAM WITH ROUTINE GYNECOLOGICAL EXAM: Primary | ICD-10-CM

## 2023-03-01 PROCEDURE — 99396 PREV VISIT EST AGE 40-64: CPT | Performed by: NURSE PRACTITIONER

## 2023-03-01 PROCEDURE — 3008F BODY MASS INDEX DOCD: CPT | Performed by: NURSE PRACTITIONER

## 2023-03-01 PROCEDURE — 3079F DIAST BP 80-89 MM HG: CPT | Performed by: NURSE PRACTITIONER

## 2023-03-01 PROCEDURE — 3074F SYST BP LT 130 MM HG: CPT | Performed by: NURSE PRACTITIONER

## 2023-03-16 ENCOUNTER — TELEPHONE (OUTPATIENT)
Dept: OBGYN CLINIC | Facility: CLINIC | Age: 48
End: 2023-03-16

## 2023-03-16 NOTE — TELEPHONE ENCOUNTER
Confirmed with lab they are currently taking 2-3 weeks for processing. Contacted patient. Advised of current processing time and apologized for delay. She states understanding.

## 2023-03-16 NOTE — TELEPHONE ENCOUNTER
Pt called for her pap results as its been 15 days, pap on chart shows still in process. Can someone look into this with the lab and notify pt?

## 2023-03-17 LAB — HPV I/H RISK 1 DNA SPEC QL NAA+PROBE: NEGATIVE

## 2023-04-04 ENCOUNTER — HOSPITAL ENCOUNTER (OUTPATIENT)
Dept: ULTRASOUND IMAGING | Age: 48
Discharge: HOME | End: 2023-04-04
Attending: NURSE PRACTITIONER
Payer: COMMERCIAL

## 2023-04-04 DIAGNOSIS — N93.9 ABNORMAL UTERINE BLEEDING (AUB): ICD-10-CM

## 2023-04-04 PROCEDURE — 76856 US EXAM PELVIC COMPLETE: CPT | Performed by: NURSE PRACTITIONER

## 2023-04-04 PROCEDURE — 76830 TRANSVAGINAL US NON-OB: CPT | Performed by: NURSE PRACTITIONER

## 2023-04-06 NOTE — PROGRESS NOTES
Patient called back. Results and recommendations given. Patient verbalized understanding and willingness to comply. No further questions.

## 2023-06-15 NOTE — LETTER
PA Initiation    Medication: NICOTROL 10 MG IN INHA  Insurance Company: [employer] ([facility]) - Phone [phone] Fax [phone]  Pharm[facility] Filling the Rx[id number] [place] [facility] [address]   Filling Pharmacy Phone: [phone]  Filling Pharmacy Fax:    Start Date: 6/15/2023     Pat[address], [place] [address]    1. I authorize the performance upon [address]  the following: Intrauterine Device Removal of Paragard     2.  I authorize Dr. Marco Bautista MD (and whomever is design ____________________________________________    Witness: _________________________________________ Date:___________     Physician Signature: _______________________________ Date:___________

## 2024-10-14 ENCOUNTER — WALK IN (OUTPATIENT)
Dept: URGENT CARE | Age: 49
End: 2024-10-14

## 2024-10-14 VITALS
SYSTOLIC BLOOD PRESSURE: 137 MMHG | OXYGEN SATURATION: 100 % | TEMPERATURE: 97.5 F | RESPIRATION RATE: 14 BRPM | HEART RATE: 88 BPM | DIASTOLIC BLOOD PRESSURE: 92 MMHG

## 2024-10-14 DIAGNOSIS — R35.0 URINARY FREQUENCY: ICD-10-CM

## 2024-10-14 DIAGNOSIS — N39.0 ACUTE UTI: Primary | ICD-10-CM

## 2024-10-14 DIAGNOSIS — R30.0 DYSURIA: ICD-10-CM

## 2024-10-14 LAB
APPEARANCE, POC: CLEAR
B-HCG UR QL: NEGATIVE
BILIRUB UR QL STRIP: NEGATIVE
COLOR UR: YELLOW
GLUCOSE UR-MCNC: NEGATIVE MG/DL
HGB UR QL STRIP: ABNORMAL
INTERNAL PROCEDURAL CONTROLS ACCEPTABLE: YES
KETONES UR STRIP-MCNC: NEGATIVE MG/DL
LEUKOCYTE ESTERASE UR QL STRIP: ABNORMAL
NITRITE UR QL STRIP: NEGATIVE
PH UR: 7 [PH] (ref 5–7)
PROT UR-MCNC: NEGATIVE MG/DL
SP GR UR: 1.01 (ref 1–1.03)
TEST LOT EXPIRATION DATE: NORMAL
TEST LOT NUMBER: NORMAL
UROBILINOGEN UR-MCNC: 0.2 MG/DL (ref 0–1)

## 2024-10-14 PROCEDURE — 81025 URINE PREGNANCY TEST: CPT | Performed by: PHYSICIAN ASSISTANT

## 2024-10-14 PROCEDURE — 87086 URINE CULTURE/COLONY COUNT: CPT | Performed by: CLINICAL MEDICAL LABORATORY

## 2024-10-14 PROCEDURE — 81003 URINALYSIS AUTO W/O SCOPE: CPT | Performed by: PHYSICIAN ASSISTANT

## 2024-10-14 PROCEDURE — 99203 OFFICE O/P NEW LOW 30 MIN: CPT | Performed by: PHYSICIAN ASSISTANT

## 2024-10-14 RX ORDER — FLUTICASONE PROPIONATE 50 MCG
SPRAY, SUSPENSION (ML) NASAL
COMMUNITY

## 2024-10-14 RX ORDER — CHLORAL HYDRATE 500 MG
CAPSULE ORAL
COMMUNITY

## 2024-10-14 RX ORDER — NITROFURANTOIN 25; 75 MG/1; MG/1
100 CAPSULE ORAL 2 TIMES DAILY
Qty: 10 CAPSULE | Refills: 0 | Status: SHIPPED | OUTPATIENT
Start: 2024-10-14 | End: 2024-10-19

## 2024-10-14 RX ORDER — BACILLUS COAGULANS/INULIN 1B-250 MG
CAPSULE ORAL
COMMUNITY

## 2024-10-14 ASSESSMENT — ENCOUNTER SYMPTOMS
NAUSEA: 0
CHILLS: 0
FEVER: 0
BACK PAIN: 0
VOMITING: 0

## 2024-10-15 LAB — BACTERIA UR CULT: NORMAL

## 2024-12-16 ENCOUNTER — OFFICE VISIT (OUTPATIENT)
Dept: OBGYN CLINIC | Facility: CLINIC | Age: 49
End: 2024-12-16
Payer: COMMERCIAL

## 2024-12-16 VITALS
WEIGHT: 147.63 LBS | DIASTOLIC BLOOD PRESSURE: 78 MMHG | BODY MASS INDEX: 27 KG/M2 | SYSTOLIC BLOOD PRESSURE: 120 MMHG | HEART RATE: 74 BPM

## 2024-12-16 DIAGNOSIS — Z01.419 WELL WOMAN EXAM WITH ROUTINE GYNECOLOGICAL EXAM: Primary | ICD-10-CM

## 2024-12-16 DIAGNOSIS — Z85.3 PERSONAL HISTORY OF BREAST CANCER: ICD-10-CM

## 2024-12-16 DIAGNOSIS — Z12.4 CERVICAL CANCER SCREENING: ICD-10-CM

## 2024-12-16 DIAGNOSIS — N95.2 VAGINAL ATROPHY: ICD-10-CM

## 2024-12-16 DIAGNOSIS — Z23 NEED FOR VACCINATION: ICD-10-CM

## 2024-12-16 PROCEDURE — 88175 CYTOPATH C/V AUTO FLUID REDO: CPT | Performed by: NURSE PRACTITIONER

## 2024-12-16 PROCEDURE — 87624 HPV HI-RISK TYP POOLED RSLT: CPT | Performed by: NURSE PRACTITIONER

## 2024-12-16 RX ORDER — ESTRADIOL 0.1 MG/G
1 CREAM VAGINAL WEEKLY
Qty: 42.5 G | Refills: 1 | Status: SHIPPED | OUTPATIENT
Start: 2024-12-16

## 2024-12-16 NOTE — PROGRESS NOTES
Here for Routine Annual Exam  No concerns or questions.  She ran out of estradiol cream which she uses as needed. Her Oncologist is aware.    ROS: No Cardiac, Respiratory, GI,  or Neurological symptoms.    PE:  GENERAL: well developed, well nourished, in no apparent distress  SKIN: no rashes, no suspicious lesions  HEENT: normal  NECK: supple; no thyroidmegaly, no adenopathy  LUNGS: clear to auscultation  CARDIOVASCULAR: normal S1, S2, RRR  BREASTS: firm, nontendder, no palpable masses or nodes, no nipple discharge, no skin changes, no axillary adenopathy,    ABDOMEN: Soft, non distended; non tender, no masses  GYNE/: External Genitalia: Normal without lesions or erythema                      Vagina: normal without lesions or discharge                      Uterus: mid, mobile, non tender, normal size                     Cervix: no lesions or CMT                     Adnexa: non tender, no masses, normal size  EXTREMITIES:  non tender without edema    A/P:   1. Well woman exam with routine gynecological exam    2. Cervical cancer screening  Thin Prep with HPV    3. Personal history of breast cancer  Continue routine care with oncology    4. Vaginal atrophy  - estradiol 0.1 MG/GM Vaginal Cream; Place 1 g vaginally once a week.  Dispense: 42.5 g; Refill: 1    5. Need for vaccination  - INFLUENZA VACCINE, TRI, PRESERV FREE, 0.5 ML       Return to clinic 1 year for routine exam, or as needed with any concerns or question

## 2024-12-24 LAB
.: NORMAL
.: NORMAL

## 2024-12-26 LAB — HPV E6+E7 MRNA CVX QL NAA+PROBE: NEGATIVE

## 2025-01-06 ENCOUNTER — TELEPHONE (OUTPATIENT)
Dept: OBGYN CLINIC | Facility: CLINIC | Age: 50
End: 2025-01-06

## 2025-01-06 NOTE — TELEPHONE ENCOUNTER
Per patient Pricila was to send a prescription of Estradiol to her [facility] and per the [facility] no order has been sent to them. Can you please send order and let her know via [employer]

## 2025-01-06 NOTE — TELEPHONE ENCOUNTER
Dee sent Rx on 12/16. I attempted to contact [facility] but \"due to technical problems\"  on their end, my call could not be completed.  Will try again. At later time.

## 2025-05-28 ENCOUNTER — IMAGING SERVICES (OUTPATIENT)
Dept: GENERAL RADIOLOGY | Age: 50
End: 2025-05-28
Attending: NURSE PRACTITIONER

## 2025-05-28 ENCOUNTER — WALK IN (OUTPATIENT)
Dept: URGENT CARE | Age: 50
End: 2025-05-28

## 2025-05-28 VITALS
BODY MASS INDEX: 24.84 KG/M2 | RESPIRATION RATE: 16 BRPM | HEART RATE: 88 BPM | SYSTOLIC BLOOD PRESSURE: 136 MMHG | OXYGEN SATURATION: 99 % | TEMPERATURE: 98.5 F | HEIGHT: 62 IN | WEIGHT: 135 LBS | DIASTOLIC BLOOD PRESSURE: 89 MMHG

## 2025-05-28 DIAGNOSIS — S22.41XA CLOSED FRACTURE OF MULTIPLE RIBS OF RIGHT SIDE, INITIAL ENCOUNTER: ICD-10-CM

## 2025-05-28 DIAGNOSIS — R07.81 RIB PAIN ON RIGHT SIDE: ICD-10-CM

## 2025-05-28 DIAGNOSIS — R07.81 RIB PAIN ON RIGHT SIDE: Primary | ICD-10-CM

## 2025-05-28 PROCEDURE — 71101 X-RAY EXAM UNILAT RIBS/CHEST: CPT | Performed by: RADIOLOGY

## 2025-05-28 PROCEDURE — 99214 OFFICE O/P EST MOD 30 MIN: CPT | Performed by: NURSE PRACTITIONER

## 2025-05-28 RX ORDER — TRAMADOL HYDROCHLORIDE 50 MG/1
50 TABLET ORAL EVERY 6 HOURS PRN
Qty: 12 TABLET | Refills: 0 | Status: SHIPPED | OUTPATIENT
Start: 2025-05-28

## 2025-05-28 ASSESSMENT — ENCOUNTER SYMPTOMS
WOUND: 0
COLOR CHANGE: 1
FEVER: 0
ABDOMINAL PAIN: 0
COUGH: 0
SHORTNESS OF BREATH: 0

## (undated) NOTE — MR AVS SNAPSHOT
[address], [facility] [address] [address] [phone]               Thank you for choosing us for your health care visit with Moises Chen MD.  We are glad to serve you and happy to provide you with this sum

## (undated) NOTE — MR AVS SNAPSHOT
[address], [facility] [address] [id number] [phone]               Thank you for choosing us for your health care visit with Joni Rashid MD.  We are glad to serve you and happy to provide you with this sum CoQ10 100 MG Caps   1 CAPSULE DAILY           multivitamin Tabs   1 TABLET DAILY           omega-3 fatty acids 1000 MG Caps   1-2 CAPSULES DAILY WITH A MEAL   Commonly known as:  FISH OIL           PARAGARD INTRAUTERINE COPPER Iud   1 Device by Opal Huerta